# Patient Record
Sex: FEMALE | Race: WHITE | Employment: FULL TIME | URBAN - NONMETROPOLITAN AREA
[De-identification: names, ages, dates, MRNs, and addresses within clinical notes are randomized per-mention and may not be internally consistent; named-entity substitution may affect disease eponyms.]

---

## 2023-01-09 ENCOUNTER — APPOINTMENT (OUTPATIENT)
Dept: GENERAL RADIOLOGY | Age: 63
End: 2023-01-09
Attending: FAMILY MEDICINE
Payer: COMMERCIAL

## 2023-01-09 ENCOUNTER — HOSPITAL ENCOUNTER (OUTPATIENT)
Age: 63
Setting detail: OBSERVATION
Discharge: HOME | End: 2023-01-10
Attending: FAMILY MEDICINE | Admitting: FAMILY MEDICINE
Payer: COMMERCIAL

## 2023-01-09 DIAGNOSIS — R07.9 CHEST PAIN, UNSPECIFIED TYPE: Primary | ICD-10-CM

## 2023-01-09 LAB
ALBUMIN SERPL-MCNC: 3.5 G/DL (ref 3.4–5)
ALBUMIN/GLOB SERPL: 1 (ref 0.8–1.7)
ALP SERPL-CCNC: 43 U/L (ref 45–117)
ALT SERPL-CCNC: 30 U/L (ref 13–56)
ANION GAP SERPL CALC-SCNC: 8 MMOL/L (ref 3–18)
APPEARANCE UR: CLEAR
AST SERPL W P-5'-P-CCNC: 21 U/L (ref 10–38)
ATRIAL RATE: 72 BPM
BASOPHILS # BLD: 0.1 K/UL (ref 0–0.1)
BASOPHILS NFR BLD: 1 % (ref 0–2)
BILIRUB SERPL-MCNC: 0.2 MG/DL (ref 0.2–1)
BILIRUB UR QL: NEGATIVE
BUN SERPL-MCNC: 10 MG/DL (ref 7–18)
BUN/CREAT SERPL: 15 (ref 12–20)
CA-I BLD-MCNC: 9.1 MG/DL (ref 8.5–10.1)
CALCULATED P AXIS, ECG09: 69 DEGREES
CALCULATED R AXIS, ECG10: 43 DEGREES
CALCULATED T AXIS, ECG11: 61 DEGREES
CHLORIDE SERPL-SCNC: 103 MMOL/L (ref 100–111)
CO2 SERPL-SCNC: 29 MMOL/L (ref 21–32)
COLOR UR: YELLOW
CREAT SERPL-MCNC: 0.68 MG/DL (ref 0.6–1.3)
D DIMER PPP FEU-MCNC: <0.27 UG/ML(FEU)
DIAGNOSIS, 93000: NORMAL
DIFFERENTIAL METHOD BLD: ABNORMAL
EOSINOPHIL # BLD: 0.1 K/UL (ref 0–0.4)
EOSINOPHIL NFR BLD: 1 % (ref 0–5)
ERYTHROCYTE [DISTWIDTH] IN BLOOD BY AUTOMATED COUNT: 12.1 % (ref 11.6–14.5)
GLOBULIN SER CALC-MCNC: 3.5 G/DL (ref 2–4)
GLUCOSE SERPL-MCNC: 99 MG/DL (ref 74–99)
GLUCOSE UR STRIP.AUTO-MCNC: NEGATIVE MG/DL
HCT VFR BLD AUTO: 40.3 % (ref 35–45)
HGB BLD-MCNC: 13.8 G/DL (ref 12–16)
HGB UR QL STRIP: NEGATIVE
IMM GRANULOCYTES # BLD AUTO: 0.1 K/UL (ref 0–0.04)
IMM GRANULOCYTES NFR BLD AUTO: 1 % (ref 0–0.5)
KETONES UR QL STRIP.AUTO: NEGATIVE MG/DL
LEUKOCYTE ESTERASE UR QL STRIP.AUTO: NEGATIVE
LIPASE SERPL-CCNC: 202 U/L (ref 73–393)
LYMPHOCYTES # BLD: 3.4 K/UL (ref 0.9–3.6)
LYMPHOCYTES NFR BLD: 36 % (ref 21–52)
MAGNESIUM SERPL-MCNC: 2.2 MG/DL (ref 1.6–2.6)
MCH RBC QN AUTO: 31 PG (ref 24–34)
MCHC RBC AUTO-ENTMCNC: 34.2 G/DL (ref 31–37)
MCV RBC AUTO: 90.6 FL (ref 78–100)
MONOCYTES # BLD: 0.7 K/UL (ref 0.05–1.2)
MONOCYTES NFR BLD: 8 % (ref 3–10)
NEUTS SEG # BLD: 5.1 K/UL (ref 1.8–8)
NEUTS SEG NFR BLD: 53 % (ref 40–73)
NITRITE UR QL STRIP.AUTO: NEGATIVE
NRBC # BLD: 0 K/UL (ref 0–0.01)
NRBC BLD-RTO: 0 PER 100 WBC
P-R INTERVAL, ECG05: 128 MS
PH UR STRIP: 8 (ref 5–8)
PLATELET # BLD AUTO: 243 K/UL (ref 135–420)
PMV BLD AUTO: 10.6 FL (ref 9.2–11.8)
POTASSIUM SERPL-SCNC: 4.2 MMOL/L (ref 3.5–5.5)
PROT SERPL-MCNC: 7 G/DL (ref 6.4–8.2)
PROT UR STRIP-MCNC: NEGATIVE MG/DL
Q-T INTERVAL, ECG07: 393 MS
QRS DURATION, ECG06: 73 MS
QTC CALCULATION (BEZET), ECG08: 431 MS
RBC # BLD AUTO: 4.45 M/UL (ref 4.2–5.3)
SODIUM SERPL-SCNC: 140 MMOL/L (ref 136–145)
SP GR UR REFRACTOMETRY: 1.01 (ref 1–1.03)
TROPONIN-HIGH SENSITIVITY: 7 NG/L (ref 0–54)
TROPONIN-HIGH SENSITIVITY: 9 NG/L (ref 0–54)
UROBILINOGEN UR QL STRIP.AUTO: 0.2 EU/DL (ref 0.2–1)
VENTRICULAR RATE, ECG03: 72 BPM
WBC # BLD AUTO: 9.5 K/UL (ref 4.6–13.2)

## 2023-01-09 PROCEDURE — 74011250637 HC RX REV CODE- 250/637: Performed by: FAMILY MEDICINE

## 2023-01-09 PROCEDURE — 85025 COMPLETE CBC W/AUTO DIFF WBC: CPT

## 2023-01-09 PROCEDURE — 74011000250 HC RX REV CODE- 250: Performed by: NURSE PRACTITIONER

## 2023-01-09 PROCEDURE — 83735 ASSAY OF MAGNESIUM: CPT

## 2023-01-09 PROCEDURE — 71045 X-RAY EXAM CHEST 1 VIEW: CPT

## 2023-01-09 PROCEDURE — 84484 ASSAY OF TROPONIN QUANT: CPT

## 2023-01-09 PROCEDURE — 96374 THER/PROPH/DIAG INJ IV PUSH: CPT

## 2023-01-09 PROCEDURE — 74011250637 HC RX REV CODE- 250/637: Performed by: NURSE PRACTITIONER

## 2023-01-09 PROCEDURE — G0378 HOSPITAL OBSERVATION PER HR: HCPCS

## 2023-01-09 PROCEDURE — 80053 COMPREHEN METABOLIC PANEL: CPT

## 2023-01-09 PROCEDURE — 99285 EMERGENCY DEPT VISIT HI MDM: CPT

## 2023-01-09 PROCEDURE — 96375 TX/PRO/DX INJ NEW DRUG ADDON: CPT

## 2023-01-09 PROCEDURE — 85379 FIBRIN DEGRADATION QUANT: CPT

## 2023-01-09 PROCEDURE — 93005 ELECTROCARDIOGRAM TRACING: CPT

## 2023-01-09 PROCEDURE — 81003 URINALYSIS AUTO W/O SCOPE: CPT

## 2023-01-09 PROCEDURE — 74011250636 HC RX REV CODE- 250/636: Performed by: FAMILY MEDICINE

## 2023-01-09 PROCEDURE — 83690 ASSAY OF LIPASE: CPT

## 2023-01-09 RX ORDER — ONDANSETRON 2 MG/ML
4 INJECTION INTRAMUSCULAR; INTRAVENOUS
Status: DISCONTINUED | OUTPATIENT
Start: 2023-01-09 | End: 2023-01-10 | Stop reason: HOSPADM

## 2023-01-09 RX ORDER — HYDRALAZINE HYDROCHLORIDE 20 MG/ML
20 INJECTION INTRAMUSCULAR; INTRAVENOUS ONCE
Status: COMPLETED | OUTPATIENT
Start: 2023-01-09 | End: 2023-01-09

## 2023-01-09 RX ORDER — SODIUM CHLORIDE 0.9 % (FLUSH) 0.9 %
5-40 SYRINGE (ML) INJECTION EVERY 8 HOURS
Status: DISCONTINUED | OUTPATIENT
Start: 2023-01-09 | End: 2023-01-10 | Stop reason: HOSPADM

## 2023-01-09 RX ORDER — SODIUM CHLORIDE 0.9 % (FLUSH) 0.9 %
5-40 SYRINGE (ML) INJECTION AS NEEDED
Status: DISCONTINUED | OUTPATIENT
Start: 2023-01-09 | End: 2023-01-10 | Stop reason: HOSPADM

## 2023-01-09 RX ORDER — PANTOPRAZOLE SODIUM 40 MG/1
40 TABLET, DELAYED RELEASE ORAL
Status: DISCONTINUED | OUTPATIENT
Start: 2023-01-10 | End: 2023-01-10 | Stop reason: HOSPADM

## 2023-01-09 RX ORDER — ACETAMINOPHEN 650 MG/1
650 SUPPOSITORY RECTAL
Status: DISCONTINUED | OUTPATIENT
Start: 2023-01-09 | End: 2023-01-10 | Stop reason: HOSPADM

## 2023-01-09 RX ORDER — GUAIFENESIN 100 MG/5ML
81 LIQUID (ML) ORAL DAILY
Status: DISCONTINUED | OUTPATIENT
Start: 2023-01-10 | End: 2023-01-10 | Stop reason: HOSPADM

## 2023-01-09 RX ORDER — ASPIRIN 325 MG
325 TABLET ORAL
Status: COMPLETED | OUTPATIENT
Start: 2023-01-09 | End: 2023-01-09

## 2023-01-09 RX ORDER — ACETAMINOPHEN 325 MG/1
650 TABLET ORAL
Status: DISCONTINUED | OUTPATIENT
Start: 2023-01-09 | End: 2023-01-10 | Stop reason: HOSPADM

## 2023-01-09 RX ORDER — POLYETHYLENE GLYCOL 3350 17 G/17G
17 POWDER, FOR SOLUTION ORAL DAILY PRN
Status: DISCONTINUED | OUTPATIENT
Start: 2023-01-09 | End: 2023-01-10 | Stop reason: HOSPADM

## 2023-01-09 RX ORDER — GUAIFENESIN 100 MG/5ML
81 LIQUID (ML) ORAL DAILY
COMMUNITY

## 2023-01-09 RX ORDER — METOPROLOL TARTRATE 5 MG/5ML
5 INJECTION INTRAVENOUS
Status: DISPENSED | OUTPATIENT
Start: 2023-01-09 | End: 2023-01-10

## 2023-01-09 RX ORDER — ENOXAPARIN SODIUM 100 MG/ML
40 INJECTION SUBCUTANEOUS DAILY
Status: DISCONTINUED | OUTPATIENT
Start: 2023-01-10 | End: 2023-01-10 | Stop reason: HOSPADM

## 2023-01-09 RX ORDER — ONDANSETRON 4 MG/1
4 TABLET, ORALLY DISINTEGRATING ORAL
Status: DISCONTINUED | OUTPATIENT
Start: 2023-01-09 | End: 2023-01-10 | Stop reason: HOSPADM

## 2023-01-09 RX ORDER — PRAVASTATIN SODIUM 40 MG/1
40 TABLET ORAL
COMMUNITY

## 2023-01-09 RX ORDER — IBUPROFEN 200 MG
1 TABLET ORAL DAILY
Status: DISCONTINUED | OUTPATIENT
Start: 2023-01-10 | End: 2023-01-10 | Stop reason: HOSPADM

## 2023-01-09 RX ORDER — AMLODIPINE BESYLATE 5 MG/1
5 TABLET ORAL DAILY
Status: DISCONTINUED | OUTPATIENT
Start: 2023-01-10 | End: 2023-01-10 | Stop reason: HOSPADM

## 2023-01-09 RX ORDER — OMEPRAZOLE 20 MG/1
20 CAPSULE, DELAYED RELEASE ORAL DAILY
COMMUNITY

## 2023-01-09 RX ORDER — DIPHENHYDRAMINE HYDROCHLORIDE 50 MG/ML
25 INJECTION, SOLUTION INTRAMUSCULAR; INTRAVENOUS
Status: COMPLETED | OUTPATIENT
Start: 2023-01-09 | End: 2023-01-09

## 2023-01-09 RX ORDER — PRAVASTATIN SODIUM 20 MG/1
40 TABLET ORAL
Status: DISCONTINUED | OUTPATIENT
Start: 2023-01-09 | End: 2023-01-10 | Stop reason: HOSPADM

## 2023-01-09 RX ADMIN — ASPIRIN 325 MG ORAL TABLET 325 MG: 325 PILL ORAL at 15:31

## 2023-01-09 RX ADMIN — PRAVASTATIN SODIUM 40 MG: 20 TABLET ORAL at 21:57

## 2023-01-09 RX ADMIN — SODIUM CHLORIDE, PRESERVATIVE FREE 10 ML: 5 INJECTION INTRAVENOUS at 21:58

## 2023-01-09 RX ADMIN — HYDRALAZINE HYDROCHLORIDE 20 MG: 20 INJECTION INTRAMUSCULAR; INTRAVENOUS at 15:37

## 2023-01-09 RX ADMIN — DIPHENHYDRAMINE HYDROCHLORIDE 25 MG: 50 INJECTION, SOLUTION INTRAMUSCULAR; INTRAVENOUS at 17:00

## 2023-01-09 RX ADMIN — ACETAMINOPHEN 650 MG: 325 TABLET ORAL at 21:58

## 2023-01-09 NOTE — ED TRIAGE NOTES
Has been having chest pain since this morning, then this afternoon develped, jaw pain and pains in both upper arms

## 2023-01-09 NOTE — ED NOTES
Pt states she feels funny, her face is mildly flushed dhe is shaking but not cold, feels very jittery, EDP made aware

## 2023-01-09 NOTE — ED PROVIDER NOTES
Patient presents to the ED with chest pain that woke her from sleep this morning at 0400, radiates to the jaw bilaterally and upper arms bilaterally, associated with nausea. No inciting event to her symptoms. Denies similar symptoms in the past. Chest pain gradually went away so patient went to work. She reports while at work, she then developed the jaw and upper arm pain as well as nausea. Nausea and pain have improved at this time. She states he coworkers told her she \"looked bad\" (pale) and thought she needed to be checked out. Nothing makes her symptoms better or worse. She denies known fever, chills, ENT symptoms, cough, SOB, vomiting, diarrhea, abdominal pain, dizziness, headache or syncope       Past Medical History:   Diagnosis Date    GERD (gastroesophageal reflux disease)     Hypercholesteremia        Past Surgical History:   Procedure Laterality Date    HX  SECTION      HX CHOLECYSTECTOMY           History reviewed. No pertinent family history. Social History     Socioeconomic History    Marital status:      Spouse name: Not on file    Number of children: Not on file    Years of education: Not on file    Highest education level: Not on file   Occupational History    Not on file   Tobacco Use    Smoking status: Every Day     Packs/day: 0.25     Types: Cigarettes    Smokeless tobacco: Not on file   Substance and Sexual Activity    Alcohol use: Not on file    Drug use: Not on file    Sexual activity: Not on file   Other Topics Concern    Not on file   Social History Narrative    Not on file     Social Determinants of Health     Financial Resource Strain: Not on file   Food Insecurity: Not on file   Transportation Needs: Not on file   Physical Activity: Not on file   Stress: Not on file   Social Connections: Not on file   Intimate Partner Violence: Not on file   Housing Stability: Not on file         ALLERGIES: Patient has no allergy information on record.     Review of Systems Constitutional:  Positive for chills. Cardiovascular:  Positive for chest pain. Gastrointestinal:  Positive for nausea. Musculoskeletal:  Positive for myalgias. Jaw pain   All other systems reviewed and are negative. Vitals:    01/09/23 1500   BP: (!) 204/113   Pulse: 73   Resp: 21   Temp: 98 °F (36.7 °C)   SpO2: 100%   Weight: 69.4 kg (153 lb)   Height: 5' 5\" (1.651 m)            Physical Exam  Vitals and nursing note reviewed. Constitutional:       General: She is not in acute distress. Appearance: Normal appearance. She is not ill-appearing, toxic-appearing or diaphoretic. HENT:      Head: Normocephalic and atraumatic. Right Ear: External ear normal.      Left Ear: External ear normal.      Nose: Nose normal.      Mouth/Throat:      Mouth: Mucous membranes are moist.      Pharynx: Oropharynx is clear. Eyes:      General: No scleral icterus. Right eye: No discharge. Left eye: No discharge. Extraocular Movements: Extraocular movements intact. Pupils: Pupils are equal, round, and reactive to light. Cardiovascular:      Rate and Rhythm: Normal rate and regular rhythm. Pulses: Normal pulses. Pulmonary:      Effort: Pulmonary effort is normal. No respiratory distress. Breath sounds: Normal breath sounds. No stridor. No wheezing, rhonchi or rales. Chest:      Chest wall: No tenderness. Abdominal:      General: Abdomen is flat. Bowel sounds are normal. There is no distension. Palpations: Abdomen is soft. Tenderness: There is no abdominal tenderness. There is no guarding or rebound. Musculoskeletal:         General: No tenderness. Cervical back: Neck supple. No tenderness. Right lower leg: No edema. Left lower leg: No edema. Lymphadenopathy:      Cervical: No cervical adenopathy. Skin:     General: Skin is warm and dry. Neurological:      General: No focal deficit present.       Mental Status: She is alert and oriented to person, place, and time. Sensory: No sensory deficit. Motor: No weakness. Medical Decision Making  Ddx including MI, PE, angina, anxiety, PNA    Amount and/or Complexity of Data Reviewed  Labs: ordered. Details: no acute findings  Radiology: ordered. Details: no acute findings  ECG/medicine tests: ordered. Details: normal  Discussion of management or test interpretation with external provider(s): Spoke with hospitalist, Conchita Wetzel, who accepts patient for observation for chest pain work up    Risk  OTC drugs. Prescription drug management.            EKG    Date/Time: 1/9/2023 3:02 PM  Performed by: Madelyn Julien DO  Authorized by: Madelyn Julien DO     ECG reviewed by ED Physician in the absence of a cardiologist: yes    Interpretation:     Interpretation: normal    Rate:     ECG rate:  72    ECG rate assessment: normal    Rhythm:     Rhythm: sinus rhythm    Ectopy:     Ectopy: none    QRS:     QRS axis:  Normal  ST segments:     ST segments:  Normal  Comments:      , Qtc 431, no ST changes

## 2023-01-10 ENCOUNTER — APPOINTMENT (OUTPATIENT)
Dept: NON INVASIVE DIAGNOSTICS | Age: 63
End: 2023-01-10
Attending: NURSE PRACTITIONER
Payer: COMMERCIAL

## 2023-01-10 VITALS
BODY MASS INDEX: 25.49 KG/M2 | SYSTOLIC BLOOD PRESSURE: 150 MMHG | OXYGEN SATURATION: 97 % | TEMPERATURE: 97.4 F | DIASTOLIC BLOOD PRESSURE: 80 MMHG | HEIGHT: 65 IN | RESPIRATION RATE: 17 BRPM | WEIGHT: 153 LBS | HEART RATE: 73 BPM

## 2023-01-10 LAB
ANION GAP SERPL CALC-SCNC: 10 MMOL/L (ref 3–18)
BUN SERPL-MCNC: 8 MG/DL (ref 7–18)
BUN/CREAT SERPL: 14 (ref 12–20)
CA-I BLD-MCNC: 9 MG/DL (ref 8.5–10.1)
CHLORIDE SERPL-SCNC: 104 MMOL/L (ref 100–111)
CHOLEST SERPL-MCNC: 166 MG/DL
CO2 SERPL-SCNC: 26 MMOL/L (ref 21–32)
CREAT SERPL-MCNC: 0.58 MG/DL (ref 0.6–1.3)
ECHO AO ASC DIAM: 2.8 CM
ECHO AO ASCENDING AORTA INDEX: 1.58 CM/M2
ECHO AO ROOT DIAM: 2.9 CM
ECHO AO ROOT INDEX: 1.64 CM/M2
ECHO AV AREA PEAK VELOCITY: 2.8 CM2
ECHO AV AREA VTI: 3 CM2
ECHO AV AREA/BSA PEAK VELOCITY: 1.6 CM2/M2
ECHO AV AREA/BSA VTI: 1.7 CM2/M2
ECHO AV MEAN GRADIENT: 4 MMHG
ECHO AV MEAN VELOCITY: 0.9 M/S
ECHO AV PEAK GRADIENT: 8 MMHG
ECHO AV PEAK VELOCITY: 1.4 M/S
ECHO AV VELOCITY RATIO: 0.86
ECHO AV VTI: 26.4 CM
ECHO EST RA PRESSURE: 3 MMHG
ECHO IVC PROX: 1.3 CM
ECHO LA AREA 2C: 15 CM2
ECHO LA AREA 4C: 15.6 CM2
ECHO LA DIAMETER INDEX: 2.15 CM/M2
ECHO LA DIAMETER: 3.8 CM
ECHO LA MAJOR AXIS: 5.2 CM
ECHO LA MINOR AXIS: 4.8 CM
ECHO LA TO AORTIC ROOT RATIO: 1.31
ECHO LA VOL BP: 39 ML (ref 22–52)
ECHO LA VOL/BSA BIPLANE: 22 ML/M2 (ref 16–34)
ECHO LV E' LATERAL VELOCITY: 8 CM/S
ECHO LV E' SEPTAL VELOCITY: 7 CM/S
ECHO LV EDV A2C: 28 ML
ECHO LV EDV A4C: 38 ML
ECHO LV EDV INDEX A4C: 21 ML/M2
ECHO LV EDV NDEX A2C: 16 ML/M2
ECHO LV EJECTION FRACTION A2C: 65 %
ECHO LV EJECTION FRACTION A4C: 69 %
ECHO LV EJECTION FRACTION BIPLANE: 65 % (ref 55–100)
ECHO LV ESV A2C: 10 ML
ECHO LV ESV A4C: 12 ML
ECHO LV ESV INDEX A2C: 6 ML/M2
ECHO LV ESV INDEX A4C: 7 ML/M2
ECHO LV FRACTIONAL SHORTENING: 38 % (ref 28–44)
ECHO LV INTERNAL DIMENSION DIASTOLE INDEX: 2.26 CM/M2
ECHO LV INTERNAL DIMENSION DIASTOLIC: 4 CM (ref 3.9–5.3)
ECHO LV INTERNAL DIMENSION SYSTOLIC INDEX: 1.41 CM/M2
ECHO LV INTERNAL DIMENSION SYSTOLIC: 2.5 CM
ECHO LV IVSD: 1.1 CM (ref 0.6–0.9)
ECHO LV MASS 2D: 155.4 G (ref 67–162)
ECHO LV MASS INDEX 2D: 87.8 G/M2 (ref 43–95)
ECHO LV POSTERIOR WALL DIASTOLIC: 1.2 CM (ref 0.6–0.9)
ECHO LV RELATIVE WALL THICKNESS RATIO: 0.6
ECHO LVOT AREA: 3.5 CM2
ECHO LVOT AV VTI INDEX: 0.85
ECHO LVOT DIAM: 2.1 CM
ECHO LVOT MEAN GRADIENT: 2 MMHG
ECHO LVOT PEAK GRADIENT: 5 MMHG
ECHO LVOT PEAK VELOCITY: 1.2 M/S
ECHO LVOT STROKE VOLUME INDEX: 44 ML/M2
ECHO LVOT SV: 77.9 ML
ECHO LVOT VTI: 22.5 CM
ECHO MV A VELOCITY: 0.74 M/S
ECHO MV AREA VTI: 2.5 CM2
ECHO MV E DECELERATION TIME (DT): 328 MS
ECHO MV E VELOCITY: 0.8 M/S
ECHO MV E/A RATIO: 1.08
ECHO MV E/E' LATERAL: 10
ECHO MV E/E' RATIO (AVERAGED): 10.71
ECHO MV E/E' SEPTAL: 11.43
ECHO MV LVOT VTI INDEX: 1.37
ECHO MV MAX VELOCITY: 1.1 M/S
ECHO MV MEAN GRADIENT: 2 MMHG
ECHO MV MEAN VELOCITY: 0.6 M/S
ECHO MV PEAK GRADIENT: 5 MMHG
ECHO MV VTI: 30.9 CM
ECHO PV MAX VELOCITY: 1 M/S
ECHO PV PEAK GRADIENT: 4 MMHG
ECHO RA AREA 4C: 13 CM2
ECHO RA END SYSTOLIC VOLUME APICAL 4 CHAMBER INDEX BSA: 16 ML/M2
ECHO RA VOLUME: 29 ML
ECHO RIGHT VENTRICULAR SYSTOLIC PRESSURE (RVSP): 30 MMHG
ECHO RV BASAL DIMENSION: 3.5 CM
ECHO RV LONGITUDINAL DIMENSION: 5.6 CM
ECHO RV MID DIMENSION: 2.2 CM
ECHO RV TAPSE: 2.1 CM (ref 1.7–?)
ECHO TV REGURGITANT MAX VELOCITY: 2.59 M/S
ECHO TV REGURGITANT PEAK GRADIENT: 27 MMHG
ERYTHROCYTE [DISTWIDTH] IN BLOOD BY AUTOMATED COUNT: 12.3 % (ref 11.6–14.5)
GLUCOSE SERPL-MCNC: 100 MG/DL (ref 74–99)
HCT VFR BLD AUTO: 39.9 % (ref 35–45)
HDLC SERPL-MCNC: 51 MG/DL (ref 40–60)
HDLC SERPL: 3.3 (ref 0–5)
HGB BLD-MCNC: 13.5 G/DL (ref 12–16)
LDLC SERPL CALC-MCNC: 84.8 MG/DL (ref 0–100)
LIPID PROFILE,FLP: ABNORMAL
MAGNESIUM SERPL-MCNC: 2.2 MG/DL (ref 1.6–2.6)
MCH RBC QN AUTO: 30.2 PG (ref 24–34)
MCHC RBC AUTO-ENTMCNC: 33.8 G/DL (ref 31–37)
MCV RBC AUTO: 89.3 FL (ref 78–100)
NRBC # BLD: 0 K/UL (ref 0–0.01)
NRBC BLD-RTO: 0 PER 100 WBC
PLATELET # BLD AUTO: 243 K/UL (ref 135–420)
PMV BLD AUTO: 10.6 FL (ref 9.2–11.8)
POTASSIUM SERPL-SCNC: 3.8 MMOL/L (ref 3.5–5.5)
RBC # BLD AUTO: 4.47 M/UL (ref 4.2–5.3)
SODIUM SERPL-SCNC: 140 MMOL/L (ref 136–145)
TRIGL SERPL-MCNC: 151 MG/DL (ref ?–150)
VLDLC SERPL CALC-MCNC: 30.2 MG/DL
WBC # BLD AUTO: 9.6 K/UL (ref 4.6–13.2)

## 2023-01-10 PROCEDURE — 74011250637 HC RX REV CODE- 250/637: Performed by: NURSE PRACTITIONER

## 2023-01-10 PROCEDURE — 83735 ASSAY OF MAGNESIUM: CPT

## 2023-01-10 PROCEDURE — 80048 BASIC METABOLIC PNL TOTAL CA: CPT

## 2023-01-10 PROCEDURE — 80061 LIPID PANEL: CPT

## 2023-01-10 PROCEDURE — 85027 COMPLETE CBC AUTOMATED: CPT

## 2023-01-10 PROCEDURE — G0378 HOSPITAL OBSERVATION PER HR: HCPCS

## 2023-01-10 PROCEDURE — 96372 THER/PROPH/DIAG INJ SC/IM: CPT

## 2023-01-10 PROCEDURE — 93306 TTE W/DOPPLER COMPLETE: CPT

## 2023-01-10 PROCEDURE — 74011250636 HC RX REV CODE- 250/636: Performed by: NURSE PRACTITIONER

## 2023-01-10 PROCEDURE — 74011000250 HC RX REV CODE- 250: Performed by: NURSE PRACTITIONER

## 2023-01-10 PROCEDURE — 36415 COLL VENOUS BLD VENIPUNCTURE: CPT

## 2023-01-10 RX ORDER — AMLODIPINE BESYLATE 5 MG/1
5 TABLET ORAL DAILY
Qty: 60 TABLET | Refills: 0 | Status: SHIPPED | OUTPATIENT
Start: 2023-01-11

## 2023-01-10 RX ADMIN — ENOXAPARIN SODIUM 40 MG: 100 INJECTION SUBCUTANEOUS at 09:41

## 2023-01-10 RX ADMIN — PANTOPRAZOLE SODIUM 40 MG: 40 TABLET, DELAYED RELEASE ORAL at 05:49

## 2023-01-10 RX ADMIN — AMLODIPINE BESYLATE 5 MG: 5 TABLET ORAL at 09:42

## 2023-01-10 RX ADMIN — SODIUM CHLORIDE, PRESERVATIVE FREE 10 ML: 5 INJECTION INTRAVENOUS at 06:00

## 2023-01-10 RX ADMIN — PANTOPRAZOLE SODIUM 40 MG: 40 TABLET, DELAYED RELEASE ORAL at 09:50

## 2023-01-10 RX ADMIN — ASPIRIN 81 MG 81 MG: 81 TABLET ORAL at 09:42

## 2023-01-10 RX ADMIN — ACETAMINOPHEN 650 MG: 325 TABLET ORAL at 05:50

## 2023-01-10 NOTE — PROGRESS NOTES
[address] January 10, 2023       RE: Ciara Vogel      To Whom It May Concern,    This is to certify that Ciara Vogel may may return to work on Thursday, January 12, 2023. Please feel free to contact my office if you have any questions or concerns. Thank you for your assistance in this matter.       Sincerely,  TAWANDA Gant, [facility]  [phone]

## 2023-01-10 NOTE — ROUTINE PROCESS
TRANSFER - IN REPORT:    Verbal report received from Kaycee NEFF(name) on [address]  being received from [address](unit) for routine progression of care      Report consisted of patients Situation, Background, Assessment and   Recommendations(SBAR). Information from the following report(s) ED summary, Medication administration, adverse Rx to Hydralazine was reviewed with the receiving nurse. Opportunity for questions and clarification was provided. Assessment completed upon patients arrival to unit and care assumed.

## 2023-01-10 NOTE — CONSULTS
CARDIOLOGY CONSULTATION      REASON FOR CONSULT: Chest pain     REQUESTING PROVIDER: NOEL Banks    CHIEF COMPLAINT:  chest pain    HISTORY OF PRESENT ILLNESS:  Smith Patel is a 58y.o. year-old female with past medical history significant for hyperlipidemia and GERD who was evaluated today due to chest pain. She reports she was awakened by midsternal chest pain that lasted about an hour then resolved w/o intervention. She went to work, later developed bilateral jaw pain, shoulder and arm pain with blurred vision and nausea. In the ED, she was noted to be hypertensive. She denies history of hypertension. She reports she was recently seen by her PCP for URI and treated with zpak. She states BP was mildly elevated at that visit but has not been prior. .    Records from hospital admission course thus far reviewed. Troponin negative x2. EKG without acute ischemia. No further complaints of chest pain. No dyspnea, orthopnea, or recent activity intolerance. She reports for the last 2 wks she has been trying to quit smoking and is using nicotine patches. She reports when the patch is off, she will occasionally smoke. Telemetry reviewed. No events overnight. Sinus rhythm. INPATIENT MEDICATIONS:  Home medications reviewed.     Current Facility-Administered Medications:     sodium chloride (NS) flush 5-40 mL, 5-40 mL, IntraVENous, Q8H, Jayne Casas, ACNP, 10 mL at 01/10/23 0600    sodium chloride (NS) flush 5-40 mL, 5-40 mL, IntraVENous, PRN, Jayne Casas S, ACNP    acetaminophen (TYLENOL) tablet 650 mg, 650 mg, Oral, Q6H PRN, 650 mg at 01/10/23 0550 **OR** acetaminophen (TYLENOL) suppository 650 mg, 650 mg, Rectal, Q6H PRN, Jayne Casas, ACNP    polyethylene glycol (MIRALAX) packet 17 g, 17 g, Oral, DAILY PRN, Jayne Casas, ACNP    ondansetron (ZOFRAN ODT) tablet 4 mg, 4 mg, Oral, Q8H PRN **OR** ondansetron (ZOFRAN) injection 4 mg, 4 mg, IntraVENous, Q6H PRN, Augusto Jayne S, ACNP    enoxaparin (LOVENOX) injection 40 mg, 40 mg, SubCUTAneous, DAILY, Augusto, Jayne S, ACNP, 40 mg at 01/10/23 0941    amLODIPine (NORVASC) tablet 5 mg, 5 mg, Oral, DAILY, [place], Jayne S, ACNP, 5 mg at 01/10/23 3455    aspirin chewable tablet 81 mg, 81 mg, Oral, DAILY, [place], Jayne S, ACNP, 81 mg at 01/10/23 0942    pravastatin (PRAVACHOL) tablet 40 mg, 40 mg, Oral, QHS, Augusto, Jayne S, ACNP, 40 mg at 01/09/23 2157    pantoprazole (PROTONIX) tablet 40 mg, 40 mg, Oral, ACB, Augusto, Jayne S, ACNP, 40 mg at 01/10/23 0950    nicotine (NICODERM CQ) 14 mg/24 hr patch 1 Patch, 1 Patch, TransDERmal, DAILY, Augusto, Jayne S, ACNP, 1 Patch at 01/10/23 7797     ALLERGIES:  Allergies reviewed with the patient,No Known Allergies . FAMILY HISTORY:  Family history reviewed. SOCIAL HISTORY:  Notable for current tobacco use, no heavy alcohol or illicit drug use. REVIEW OF SYSTEMS:  Complete review of systems performed, pertinents noted above, all other systems are negative. PHYSICAL EXAMINATION:  Vital sign assessment reveal a blood pressure of  150/80  and pulse rate of 97. Cardiovascular exam has a heart with a regular rate and rhythm, normal S1 and S2. No murmur present. There are no rubs or gallops. Good peripheral pulses. No jugular venous distension. No carotid bruits are present. Respiratory exam reveals clear lung fields, no rales or rhonchi. Gastrointestinal exam has soft, nontender abdomen with normal bowel sounds. Lymphatic exam reveals no edema and no varicosities. No notable skin changes. Neurologic exam is nonfocal.  Musculoskeletal exam is notable for a normal gait. Visit Vitals  BP (!) 150/80   Pulse 73   Temp 97.4 °F (36.3 °C)   Resp 17   Ht 5' 5\" (1.651 m)   Wt 69.4 kg (153 lb)   SpO2 97%   BMI 25.46 kg/m²         Recent labs results and imaging reviewed.   Notable findings include   Lab Results   Component Value Date/Time    WBC 9.6 01/10/2023 04:17 AM    HGB 13.5 01/10/2023 04:17 AM    HCT 39.9 01/10/2023 04:17 AM    PLATELET 452 66/35/5734 04:17 AM    MCV 89.3 01/10/2023 04:17 AM     Lab Results   Component Value Date/Time    Sodium 140 01/10/2023 04:17 AM    Potassium 3.8 01/10/2023 04:17 AM    Chloride 104 01/10/2023 04:17 AM    CO2 26 01/10/2023 04:17 AM    Anion gap 10 01/10/2023 04:17 AM    Glucose 100 (H) 01/10/2023 04:17 AM    BUN 8 01/10/2023 04:17 AM    Creatinine 0.58 (L) 01/10/2023 04:17 AM    BUN/Creatinine ratio 14 01/10/2023 04:17 AM    Calcium 9.0 01/10/2023 04:17 AM     .       Discussed case with Dr. Don Quigley and our impression and recommendations are as follows:  Chest pain: Troponins have been negative. No acute ischemic changes on EKG. No further complaints of pain. EF 65%, no wall motion abnormalities. Given no further symptoms and relatively normal echo, she may be discharged. Recommend outpatient follow up with stress test.   Hypertension: Blood pressure is above goal. Continue amlodipine, uptitrate as needed. Hyperlipidemia: LDL 84. Continue statin therapy      Thank you for involving us in the care of this patient. Please do not hesitate to call if additional questions arise.     GILES Leslie  1/10/2023

## 2023-01-10 NOTE — DISCHARGE SUMMARY
Hospitalist Discharge Summary     Patient ID:    Ayanna Hernandez  [id number]  58 y.o.  1960    Admit date: 1/9/2023    Discharge date : 1/10/2023    Chronic Diagnoses:    Problem List as of 1/10/2023 Never Reviewed            Codes Class Noted - Resolved    Chest pain ICD-10-CM: R07.9  ICD-9-CM: 786.50  1/9/2023 - Present       22    Final Diagnoses: Active Problems:    Chest pain (1/9/2023)        Reason for Hospitalization:    Ayanna Hernandez is a 58 y.o. female with a past medical history of Hypercholesteremia and GERD, patient presented to the ED with a chief complaint of chest pain. During patient admission cardiologist was consulted, patient was ruled out for ACS, troponin's remained negative. Patient was hypertensive when she presented to the ED, she was started on oral Norvasc 5 mg daily which will continue at discharge. Patient recommended to keep blood pressure log and have a follow-up with her PCP status post discharged. Cardiologist recommended that patient follow-up with cardiologist for possible outpatient stress test.    At discharge patient is alert and oriented x 4, chest pain, arm pain, and jaw pain free. Patient discharged home. Chest pain  - resolves, ACS resolved  - Troponins negative x 3  - EKG normal sinus rhythm without evidence of ischemia. - Chest x-ray showed no acute cardiopulmonary infiltrates. -cardiologist consulted and recommends patient follow-up with cardiologist for outpatient stress test  - ECHO reviewed: Normal left ventricular systolic function. EF by 2D Simpsons Biplane is 65%. Left ventricle size is normal. Mildly increased wall thickness. Findings consistent with mild concentric hypertrophy. Normal wall motion.      Hypertension  -acute, improved, continue Norvasc and discharge     Hypercholesteremia  -continue statin     GERD  -continue PPI     Tobacco Abuse  -continue tour nicotine patch at home    Total Time Spent: 25 minutes  Discharge Medications:   Current Discharge Medication List        START taking these medications    Details   amLODIPine (NORVASC) 5 mg tablet Take 1 Tablet by mouth daily. Qty: 60 Tablet, Refills: 0  Start date: 1/11/2023           CONTINUE these medications which have NOT CHANGED    Details   omeprazole (PRILOSEC) 20 mg capsule Take 20 mg by mouth daily. aspirin 81 mg chewable tablet Take 81 mg by mouth daily. pravastatin (PRAVACHOL) 40 mg tablet Take 40 mg by mouth nightly. Follow up Care:    1. None in 1-2 weeks. Follow-up Information       Follow up With Specialties Details Why Contact Info    Dr. Don Quigley  Follow up on 1/24/2023 at [address] , [place], [address] [place] [address] Road    None    None ([phone]) Patient stated that they have no PCP            Patient Follow Up Instructions: Activity: Activity as tolerated  Diet:  Cardiac Diet    Condition at Discharge:  Stable  __________________________________________________________________    Disposition  Home or Self Care  ____________________________________________________________________    Code Status:  Full Code  ___________________________________________________________________    Discharge Exam:  Patient seen and examined by me on discharge day. Pertinent Findings:  Gen:    Not in distress  Chest: Clear lungs  CVS:   Regular rhythm.   No edema  Abd:  Soft, not distended, not tender  Neuro:  Alert    CONSULTATIONS: Cardiology    Significant Diagnostic Studies:   Recent Results (from the past 24 hour(s))   EKG, 12 LEAD, INITIAL    Collection Time: 01/09/23  3:02 PM   Result Value Ref Range    Ventricular Rate 72 BPM    Atrial Rate 72 BPM    P-R Interval 128 ms    QRS Duration 73 ms    Q-T Interval 393 ms    QTC Calculation (Bezet) 431 ms    Calculated P Axis 69 degrees    Calculated R Axis 43 degrees    Calculated T Axis 61 degrees    Diagnosis       Sinus rhythm    Confirmed by [facility] ([id number]) on 1/9/2023 7:44:41 PM     CBC WITH AUTOMATED DIFF    Collection Time: 01/09/23  3:10 PM   Result Value Ref Range    WBC 9.5 4.6 - 13.2 K/uL    RBC 4.45 4.20 - 5.30 M/uL    HGB 13.8 12.0 - 16.0 g/dL    HCT 40.3 35.0 - 45.0 %    MCV 90.6 78.0 - 100.0 FL    MCH 31.0 24.0 - 34.0 PG    MCHC 34.2 31.0 - 37.0 g/dL    RDW 12.1 11.6 - 14.5 %    PLATELET 361 870 - 604 K/uL    MPV 10.6 9.2 - 11.8 FL    NRBC 0.0 0.0  WBC    ABSOLUTE NRBC 0.00 0.00 - 0.01 K/uL    NEUTROPHILS 53 40 - 73 %    LYMPHOCYTES 36 21 - 52 %    MONOCYTES 8 3 - 10 %    EOSINOPHILS 1 0 - 5 %    BASOPHILS 1 0 - 2 %    IMMATURE GRANULOCYTES 1 (H) 0 - 0.5 %    ABS. NEUTROPHILS 5.1 1.8 - 8.0 K/UL    ABS. LYMPHOCYTES 3.4 0.9 - 3.6 K/UL    ABS. MONOCYTES 0.7 0.05 - 1.2 K/UL    ABS. EOSINOPHILS 0.1 0.0 - 0.4 K/UL    ABS. BASOPHILS 0.1 0.0 - 0.1 K/UL    ABS. IMM. GRANS. 0.1 (H) 0.00 - 0.04 K/UL    DF AUTOMATED     METABOLIC PANEL, COMPREHENSIVE    Collection Time: 01/09/23  3:10 PM   Result Value Ref Range    Sodium 140 136 - 145 mmol/L    Potassium 4.2 3.5 - 5.5 mmol/L    Chloride 103 100 - 111 mmol/L    CO2 29 21 - 32 mmol/L    Anion gap 8 3.0 - 18.0 mmol/L    Glucose 99 74 - 99 mg/dL    BUN 10 7 - 18 mg/dL    Creatinine 0.68 0.60 - 1.30 mg/dL    BUN/Creatinine ratio 15 12 - 20      eGFR >60 >60 ml/min/1.73m2    Calcium 9.1 8.5 - 10.1 mg/dL    Bilirubin, total 0.2 0.2 - 1.0 mg/dL    AST (SGOT) 21 10 - 38 U/L    ALT (SGPT) 30 13 - 56 U/L    Alk.  phosphatase 43 (L) 45 - 117 U/L    Protein, total 7.0 6.4 - 8.2 g/dL    Albumin 3.5 3.4 - 5.0 g/dL    Globulin 3.5 2.0 - 4.0 g/dL    A-G Ratio 1.0 0.8 - 1.7     TROPONIN-HIGH SENSITIVITY    Collection Time: 01/09/23  3:10 PM   Result Value Ref Range    Troponin-High Sensitivity 7 0 - 54 ng/L   D DIMER    Collection Time: 01/09/23  3:10 PM   Result Value Ref Range    D DIMER <0.27 <0.46 ug/ml(FEU)   LIPASE    Collection Time: 01/09/23  3:10 PM   Result Value Ref Range    Lipase 202 73 - 393 U/L   MAGNESIUM    Collection Time: 01/09/23  3:10 PM Result Value Ref Range    Magnesium 2.2 1.6 - 2.6 mg/dL   URINALYSIS W/ RFLX MICROSCOPIC    Collection Time: 01/09/23  4:40 PM   Result Value Ref Range    Color Yellow      Appearance Clear      Specific gravity 1.009 1.005 - 1.030      pH (UA) 8.0 5.0 - 8.0      Protein Negative Negative mg/dL    Glucose Negative Negative mg/dL    Ketone Negative Negative mg/dL    Bilirubin Negative Negative      Blood Negative Negative      Urobilinogen 0.2 0.2 - 1.0 EU/dL    Nitrites Negative Negative      Leukocyte Esterase Negative Negative     TROPONIN-HIGH SENSITIVITY    Collection Time: 01/09/23  5:24 PM   Result Value Ref Range    Troponin-High Sensitivity 9 0 - 54 ng/L   METABOLIC PANEL, BASIC    Collection Time: 01/10/23  4:17 AM   Result Value Ref Range    Sodium 140 136 - 145 mmol/L    Potassium 3.8 3.5 - 5.5 mmol/L    Chloride 104 100 - 111 mmol/L    CO2 26 21 - 32 mmol/L    Anion gap 10 3.0 - 18.0 mmol/L    Glucose 100 (H) 74 - 99 mg/dL    BUN 8 7 - 18 mg/dL    Creatinine 0.58 (L) 0.60 - 1.30 mg/dL    BUN/Creatinine ratio 14 12 - 20      eGFR >60 >60 ml/min/1.73m2    Calcium 9.0 8.5 - 10.1 mg/dL   MAGNESIUM    Collection Time: 01/10/23  4:17 AM   Result Value Ref Range    Magnesium 2.2 1.6 - 2.6 mg/dL   CBC W/O DIFF    Collection Time: 01/10/23  4:17 AM   Result Value Ref Range    WBC 9.6 4.6 - 13.2 K/uL    RBC 4.47 4.20 - 5.30 M/uL    HGB 13.5 12.0 - 16.0 g/dL    HCT 39.9 35.0 - 45.0 %    MCV 89.3 78.0 - 100.0 FL    MCH 30.2 24.0 - 34.0 PG    MCHC 33.8 31.0 - 37.0 g/dL    RDW 12.3 11.6 - 14.5 %    PLATELET 421 265 - 932 K/uL    MPV 10.6 9.2 - 11.8 FL    NRBC 0.0 0.0  WBC    ABSOLUTE NRBC 0.00 0.00 - 0.01 K/uL   LIPID PANEL    Collection Time: 01/10/23  4:17 AM   Result Value Ref Range    LIPID PROFILE        Cholesterol, total 166 <200 mg/dL    Triglyceride 151 (H) <150 mg/dL    HDL Cholesterol 51 40 - 60 mg/dL    LDL, calculated 84.8 0 - 100 mg/dL    VLDL, calculated 30.2 mg/dL    CHOL/HDL Ratio 3.3 0 - 5.0 ECHO ADULT COMPLETE    Collection Time: 01/10/23  8:09 AM   Result Value Ref Range    LV EDV A2C 28 mL    LV EDV A4C 38 mL    LV ESV A2C 10 mL    LV ESV A4C 12 mL    IVSd 1.1 (A) 0.6 - 0.9 cm    LVIDd 4.0 3.9 - 5.3 cm    LVIDs 2.5 cm    LVOT Diameter 2.1 cm    LVOT Mean Gradient 2 mmHg    LVOT VTI 22.5 cm    LVOT Peak Velocity 1.2 m/s    LVOT Peak Gradient 5 mmHg    LVPWd 1.2 (A) 0.6 - 0.9 cm    LV E' Lateral Velocity 8 cm/s    LV E' Septal Velocity 7 cm/s    LV Ejection Fraction A2C 65 %    LV Ejection Fraction A4C 69 %    LVOT Area 3.5 cm2    LVOT SV 77.9 ml    LA Minor Axis 4.8 cm    LA Major [place] 5.2 cm    LA Area 2C 15.0 cm2    LA Area 4C 15.6 cm2    LA Volume BP 39 22 - 52 mL    LA Diameter 3.8 cm    RA Area 4C 13.0 cm2    RA Volume 29 ml    Est. RA Pressure 3 mmHg    AV Mean Gradient 4 mmHg    AV VTI 26.4 cm    AV Mean Velocity 0.9 m/s    AV Peak Velocity 1.4 m/s    AV Peak Gradient 8 mmHg    AV Area by VTI 3.0 cm2    AV Area by Peak Velocity 2.8 cm2    Aortic Root 2.9 cm    Ascending Aorta 2.8 cm    MV E Wave Deceleration Time 328.0 ms    MV A Velocity 0.74 m/s    MV E Velocity 0.80 m/s    MV Mean Gradient 2 mmHg    MV VTI 30.9 cm    MV Mean Velocity 0.6 m/s    MV Max Velocity 1.1 m/s    MV Peak Gradient 5 mmHg    MV Area by VTI 2.5 cm2    PV Max Velocity 1.0 m/s    PV Peak Gradient 4 mmHg    RV Basal Dimension 3.5 cm    RV Longitudinal Dimension 5.6 cm    RV Mid Dimension 2.2 cm    TAPSE 2.1 1.7 cm    TR Max Velocity 2.59 m/s    TR Peak Gradient 27 mmHg    Fractional Shortening 2D 38 28 - 44 %    LV ESV Index A4C 7 mL/m2    LV EDV Index A4C 21 mL/m2    LV ESV Index A2C 6 mL/m2    LV EDV Index A2C 16 mL/m2    LVIDd Index 2.26 cm/m2    LVIDs Index 1.41 cm/m2    LV RWT Ratio 0.60     LV Mass 2D 155.4 67 - 162 g    LV Mass 2D Index 87.8 43 - 95 g/m2    MV E/A 1.08     E/E' Ratio (Averaged) 10.71     E/E' Lateral 10.00     E/E' Septal 11.43     LA Volume Index BP 22 16 - 34 ml/m2    LVOT Stroke Volume Index 44.0 mL/m2    LA Size Index 2.15 cm/m2    LA/AO Root Ratio 1.31     RA Volume Index A4C 16 mL/m2    Ao Root Index 1.64 cm/m2    Ascending Aorta Index 1.58 cm/m2    AV Velocity Ratio 0.86     LVOT:AV VTI Index 0.85     RAYMON/BSA VTI 1.7 cm2/m2    RAYMON/BSA Peak Velocity 1.6 cm2/m2    MV:LVOT VTI Index 1.37     RVSP 30 mmHg    EF BP 65 55 - 100 %    IVC Proxmal 1.3 cm     XR CHEST PORT   Final Result      No plain film evidence of acute cardiopulmonary disease, allowing for technique.            Signed:  NOEL Almeida  1/10/2023  12:58 PM

## 2023-01-10 NOTE — H&P
History and Physical    Subjective:     Lashonda Chaidez is a 58 y.o. female with a past medical history of Hypercholesteremia and GERD, patient presented to the ED with a chief complaint of chest pain. Patient reports that the pain started this am, she was awaken by the pain, which was located midsternum, continuous, lasting about one hour, rate pain 6/10. The pain resolved prior to her going to work, but while at work she developed bilateral jaw pain that radiated to her bilateral arms, other accompanying symptoms included blurred vision and nausea, denies vomiting, abdominal pain, and back pain. While in the ED patient noted to be hypertensive, patient states she has no prior history of hypertension, but was told by her PCP that her BP was mildly elevated on her last visit. Patient was medicated with IV Hydralazine in the ED which her BP improved, but patient states she developed shakes and tremors status post receiving medication. Hospital medicine consulted for admission for further treatment and evaluation, admit patient for observation. Patient assessed in the ED, at the bedside, patient is alert and oriented, there is no acute distress noted. Patient agrees to admission for a diagnosis of chest pain and uncontrolled hypertension, treatment to include ECHO, cardiologist consult, and cardiac monitoring. Admit to telemetry. Past Medical History:   Diagnosis Date    GERD (gastroesophageal reflux disease)     Hypercholesteremia       Past Surgical History:   Procedure Laterality Date    HX  SECTION      HX CHOLECYSTECTOMY       History reviewed. No pertinent family history. Social History     Tobacco Use    Smoking status: Every Day     Packs/day: 0.25     Types: Cigarettes    Smokeless tobacco: Not on file   Substance Use Topics    Alcohol use: Not on file       Prior to Admission medications    Medication Sig Start Date End Date Taking?  Authorizing Provider   omeprazole (PRILOSEC) 20 mg capsule Take 20 mg by mouth daily. Yes Pauline, MD Jose   aspirin 81 mg chewable tablet Take 81 mg by mouth daily. Yes Pauline, MD Jose   pravastatin (PRAVACHOL) 40 mg tablet Take 40 mg by mouth nightly. Yes Pauline, MD Jose     No Known Allergies       REVIEW OF SYSTEMS:       Total of 12 systems reviewed as follows:    Positive = Red  Constitutional: Negative for malaise/fatigue and weakness, negative for fever and chills   HENT: Negative for ear pain, headaches, negative for loss of sense of taste and smell. positive for jaw pain. Eyes: Negative for blurred vision and double vision   Skin: Negative for itching, negative for open areas   Cardiovascular: Positive for chest pain, negative for palpitations, negative for swelling   Respiratory: Negative for shortness or breath, negative for cough, negative for sputum production   Gastrointestinal: Negative for abdominal pain, constipation, nausea, vomiting, and diarrhea   Genitourinary: Negative for dysuria, frequency, and hematuria   Musculoskeletal: Positive for bilateral arm pain. Negative for joint pain and myalgias   Neurological: Negative for dizziness, seizures, and headaches. Positive for blurred vision   Psychiatric: Negative for depression and anxiousness        Objective:   VITALS:    Visit Vitals  BP (!) 162/80   Pulse 72   Temp 98.4 °F (36.9 °C)   Resp 16   Ht 5' 5\" (1.651 m)   Wt 69.4 kg (153 lb)   SpO2 96%   BMI 25.46 kg/m²       PHYSICAL EXAM:  Positive = Red  Constitutional: Alert and oriented x 3 and no noted acute distress appears to be stated age.    HENT: Atraumatic, nose midline, oropharynx clear ad moist, trachea midline, no supraclavicular   Eyes: Conjunctiva normal and pupils equal   Skin: Dry, intact, warm, and dry   Cardiovascular: Regular rate and rhythm, normal heart sounds, no murmurs, pulses palpable, no noted edema   Respiratory: Lungs clear throughout, no wheezes, rales, or rhonchi, effort normal   Gastrointestinal: Appearance normal, bowel sounds are normal, bowl soft and non-tender   Genitourinary: Deferred   Musculoskeletal: Normal ROM   Neurological: Alert and oriented x 3, awake. No facial droop. No slurred speech. Hand grasps equal. Strength 5/5 in all extremities. Intact sensations   Psychiatric: Affect normal, Answers questions appropriately     __________________________________________________  Care Plan discussed with:    Comments   Patient X    Family      RN     Care Manager                    Consultant:      _______________________________________________________________________  Expected  Disposition:   Home with Family X   HH/PT/OT/RN    SNF/LTC    JORGE    ________________________________________________________________________    Labs:  Recent Results (from the past 24 hour(s))   EKG, 12 LEAD, INITIAL    Collection Time: 01/09/23  3:02 PM   Result Value Ref Range    Ventricular Rate 72 BPM    Atrial Rate 72 BPM    P-R Interval 128 ms    QRS Duration 73 ms    Q-T Interval 393 ms    QTC Calculation (Bezet) 431 ms    Calculated P Axis 69 degrees    Calculated R Axis 43 degrees    Calculated T Axis 61 degrees    Diagnosis       Sinus rhythm    [facility] by [facility] [phone]) on 1/9/2023 7:44:41 PM     CBC WITH AUTOMATED DIFF    Collection Time: 01/09/23  3:10 PM   Result Value Ref Range    WBC 9.5 4.6 - 13.2 K/uL    RBC 4.45 4.20 - 5.30 M/uL    HGB 13.8 12.0 - 16.0 g/dL    HCT 40.3 35.0 - 45.0 %    MCV 90.6 78.0 - 100.0 FL    MCH 31.0 24.0 - 34.0 PG    MCHC 34.2 31.0 - 37.0 g/dL    RDW 12.1 11.6 - 14.5 %    PLATELET 140 614 - 693 K/uL    MPV 10.6 9.2 - 11.8 FL    NRBC 0.0 0.0  WBC    ABSOLUTE NRBC 0.00 0.00 - 0.01 K/uL    NEUTROPHILS 53 40 - 73 %    LYMPHOCYTES 36 21 - 52 %    MONOCYTES 8 3 - 10 %    EOSINOPHILS 1 0 - 5 %    BASOPHILS 1 0 - 2 %    IMMATURE GRANULOCYTES 1 (H) 0 - 0.5 %    ABS. NEUTROPHILS 5.1 1.8 - 8.0 K/UL    ABS. LYMPHOCYTES 3.4 0.9 - 3.6 K/UL    ABS. MONOCYTES 0.7 0.05 - 1.2 K/UL    ABS. EOSINOPHILS 0.1 0.0 - 0.4 K/UL    ABS. BASOPHILS 0.1 0.0 - 0.1 K/UL    ABS. IMM. GRANS. 0.1 (H) 0.00 - 0.04 K/UL    DF AUTOMATED     METABOLIC PANEL, COMPREHENSIVE    Collection Time: 01/09/23  3:10 PM   Result Value Ref Range    Sodium 140 136 - 145 mmol/L    Potassium 4.2 3.5 - 5.5 mmol/L    Chloride 103 100 - 111 mmol/L    CO2 29 21 - 32 mmol/L    Anion gap 8 3.0 - 18.0 mmol/L    Glucose 99 74 - 99 mg/dL    BUN 10 7 - 18 mg/dL    Creatinine 0.68 0.60 - 1.30 mg/dL    BUN/Creatinine ratio 15 12 - 20      eGFR >60 >60 ml/min/1.73m2    Calcium 9.1 8.5 - 10.1 mg/dL    Bilirubin, total 0.2 0.2 - 1.0 mg/dL    AST (SGOT) 21 10 - 38 U/L    ALT (SGPT) 30 13 - 56 U/L    Alk.  phosphatase 43 (L) 45 - 117 U/L    Protein, total 7.0 6.4 - 8.2 g/dL    Albumin 3.5 3.4 - 5.0 g/dL    Globulin 3.5 2.0 - 4.0 g/dL    A-G Ratio 1.0 0.8 - 1.7     TROPONIN-HIGH SENSITIVITY    Collection Time: 01/09/23  3:10 PM   Result Value Ref Range    Troponin-High Sensitivity 7 0 - 54 ng/L   D DIMER    Collection Time: 01/09/23  3:10 PM   Result Value Ref Range    D DIMER <0.27 <0.46 ug/ml(FEU)   LIPASE    Collection Time: 01/09/23  3:10 PM   Result Value Ref Range    Lipase 202 73 - 393 U/L   MAGNESIUM    Collection Time: 01/09/23  3:10 PM   Result Value Ref Range    Magnesium 2.2 1.6 - 2.6 mg/dL   URINALYSIS W/ RFLX MICROSCOPIC    Collection Time: 01/09/23  4:40 PM   Result Value Ref Range    Color Yellow      Appearance Clear      Specific gravity 1.009 1.005 - 1.030      pH (UA) 8.0 5.0 - 8.0      Protein Negative Negative mg/dL    Glucose Negative Negative mg/dL    Ketone Negative Negative mg/dL    Bilirubin Negative Negative      Blood Negative Negative      Urobilinogen 0.2 0.2 - 1.0 EU/dL    Nitrites Negative Negative      Leukocyte Esterase Negative Negative     TROPONIN-HIGH SENSITIVITY    Collection Time: 01/09/23  5:24 PM   Result Value Ref Range    Troponin-High Sensitivity 9 0 - 54 ng/L       Imaging:  XR CHEST PORT    Result Date: 1/9/2023  HISTORY: -Provided with order: chest pain -Additional: None Technique : AP PORTABLE CHEST Comparison : 10/27/18 FINDINGS: SUPPORT DEVICES: None. HEART AND MEDIASTINUM: Unremarkable. LUNGS AND PLEURAL SPACES: No consolidation, congestive heart failure, pleural effusion or pneumothorax. BONY THORAX AND SOFT TISSUES: No acute osseous abnormality. No plain film evidence of acute cardiopulmonary disease, allowing for technique. Assessment & Plan:     Chest pain  - Differential includes GERD/gastritis flare-up, and ACS. - Troponins negative x2. Trend serial cardiac enzymes x 1  - EKG normal sinus rhythm without evidence of ischemia. - Chest x-ray showed no acute cardiopulmonary infiltrates. - Cardiac monitoring  - CBC and BMP  -cardiologist consult  - ECHO ordered    Hypertension  -acute, per patient BP was mildly elevate at her PCP visit, but was started on any medications  -will start Norvasc, patient given Hydralazine in the ED and started having tremors  -monitor BP closely    Hypercholesteremia  -continue statin    GERD  -continue PPI    Tobacco Abuse  -nicotine patch ordered  -smoking cessation provided    TOTAL TIME:  45 Minutes    Code Status: Full    Prophylaxis:  Lovenox    Electronically Signed : Brook Rubalcava [facility] Medicine Service    Please note that this dictation was completed with [employer], the [employer] voice recognition software. Quite often unanticipated grammatical, syntax, homophones, and other interpretive errors are inadvertently transcribed by the computer software. Please disregard these errors. Please excuse any errors that have escaped final proofreading. Thank you.

## 2023-01-10 NOTE — ACP (ADVANCE CARE PLANNING)
Advance Care Planning   Advance Care Planning Inpatient Note  [address] Department    Today's Date: 1/10/2023  Unit: [address]    Received request from rounding. Upon review of chart and communication with care team, patient's decision making abilities are not in question. Patient was/were present in the room during visit.     Goals of ACP Conversation:  Discuss Advance Care planning documents    Health Care Decision Makers:      Summary:  No Decision Maker named by patient at this time      Ferny 53 (Patient Wishes) on file:  Healthcare Power of /Advance Directive appointment of Health care agent  Living Will/ Advance Directive     Assessment:       Interventions:  Provided education on documents for clarity and greater understanding  Encouraged ongoing ACP conversation with future decision makers and loved ones  Reviewed but did not complete ACP document    Care Preferences Communicated:  No    Outcomes/Plan:  ACP Discussion Refused    [facility] on 1/10/2023 at 11:56 AM

## 2023-01-10 NOTE — ASSESSMENT & PLAN NOTE
- Differential includes GERD/gastritis flare-up, and ACS. - Troponins negative x2. Trend serial cardiac enzymes x 1  - EKG normal sinus rhythm without evidence of ischemia. - Chest x-ray showed no acute cardiopulmonary infiltrates.     - Cardiac monitoring  - CBC and BMP  -cardiologist consult  - ECHO ordered

## 2023-01-10 NOTE — ACP (ADVANCE CARE PLANNING)
Advance Care Planning     General Advance Care Planning (ACP) Conversation      Date of Conversation: 1/9/2023  Conducted with: Patient with Decision Making Capacity    Healthcare Decision Maker:   No healthcare decision makers have been documented.    Click here to complete [employer] including selection of the Healthcare Decision Maker Relationship (ie \"Primary\")      Content/Action Overview:   Has NO ACP documents/care preferences - information provided, considering goals and options  Reviewed DNR/DNI and patient elects Full Code (Attempt Resuscitation)         Length of Voluntary ACP Conversation in minutes:  <16 minutes (Non-Billable)    Lenora Wells

## 2023-01-10 NOTE — PROGRESS NOTES
conducted an initial consultation and Spiritual Assessment for Evermede, who is a 58 y.o.,female. The reason the Patient came to the hospital is:   Patient Active Problem List    Diagnosis Date Noted    Chest pain 01/09/2023        The  provided the following Interventions:  Initiated a relationship of care and support. Explored issues of balbir, spirituality and/or Sikhism needs while hospitalized. Listened empathically. Provided chaplaincy education. Provided information about Spiritual Care Services. Offered prayer and assurance of continued prayers on patient's behalf. Chart reviewed. The following outcomes were achieved:  Patient shared some information about their medical narrative and spiritual journey/beliefs. Patient processed feeling about current hospitalization. Patient expressed gratitude for the 's visit. Assessment:  Patient did not indicate any spiritual or Sikhism issues which require Spiritual Care Services interventions at this time. Patient does not have any Sikhism/cultural needs that will affect patients preferences in health care. Plan:  Chaplains will continue to follow and will provide pastoral care on an as needed or requested basis.  recommends bedside caregivers page  on duty if patient shows signs of acute spiritual or emotional distress. Patient receive instruction concerning advance directive. Will discuss with spouse once she return home. No concerns and doing much better, prayer of comfort provided.      [address] [facility]   Staff [address] [facility]   [phone]

## 2023-01-10 NOTE — PROGRESS NOTES
Problem: Falls - Risk of  Goal: *Absence of Falls  Description: Document Flavio Blight Fall Risk and appropriate interventions in the flowsheet.   Outcome: Progressing Towards Goal  Note: Fall Risk Interventions:                                Problem: Patient Education: Go to Patient Education Activity  Goal: Patient/Family Education  Outcome: Progressing Towards Goal

## 2023-01-10 NOTE — PROGRESS NOTES
Assumed care of patient from ED; Denies chest pain; denies SOB; vital signs stable; needs placed within reach.

## 2023-01-10 NOTE — PROGRESS NOTES
Patient discharged home with spouse. Discharge instructions reviewed and all questions answered. Patient without any complaints. No signs of distress noted at time of discharge.

## 2023-01-10 NOTE — PROGRESS NOTES
Care Management Interventions  PCP Verified by CM: Yes  Transition of Care Consult (CM Consult): Discharge Planning  Physical Therapy Consult: No  Occupational Therapy Consult: No  Support Systems: Spouse/Significant Other  Confirm Follow Up Transport: Family  The Plan for Transition of Care is Related to the Following Treatment Goals : Patient centered discharge planning to ensure smooth transition to community and PLOF. Discharge Location  Patient Expects to be Discharged to[de-identified] Home      Pt placed in OBS after presenting to ED with complaints of chest pain. Patient found also to be hypertensive. Patient lives with  at home and is independent of ADLs. Has no DME or HH use or needs. DC POC is pt will return home. CM following for admission and DC needs.